# Patient Record
Sex: MALE | Race: WHITE
[De-identification: names, ages, dates, MRNs, and addresses within clinical notes are randomized per-mention and may not be internally consistent; named-entity substitution may affect disease eponyms.]

---

## 2019-01-10 ENCOUNTER — HOSPITAL ENCOUNTER (OUTPATIENT)
Dept: HOSPITAL 75 - RAD | Age: 34
Discharge: HOME | End: 2019-01-10
Attending: FAMILY MEDICINE

## 2019-01-10 PROCEDURE — 72100 X-RAY EXAM L-S SPINE 2/3 VWS: CPT

## 2019-01-10 NOTE — DIAGNOSTIC IMAGING REPORT
INDICATION: Pain status post injury.



COMPARISON: None



FINDINGS: Frontal and lateral views of the lumbar spine were

obtained. Alignment and vertebral heights are maintained. There

is no fracture or destructive process. There is mild

intervertebral disc height loss at L5-S1. There may be mild

sclerotic remodeling of the adjacent endplates as well. Limited

views of the abdomen demonstrate nonobstructive bowel gas

pattern. 



IMPRESSION: 

1. No acute fracture or dislocation of the lumbar spine.

2. Degenerative changes at the L5-S1 level as described above.



Dictated by: 



  Dictated on workstation # [id number]